# Patient Record
Sex: FEMALE | Race: WHITE | NOT HISPANIC OR LATINO
[De-identification: names, ages, dates, MRNs, and addresses within clinical notes are randomized per-mention and may not be internally consistent; named-entity substitution may affect disease eponyms.]

---

## 2021-05-25 ENCOUNTER — APPOINTMENT (OUTPATIENT)
Age: 21
End: 2021-05-25

## 2021-06-08 ENCOUNTER — APPOINTMENT (OUTPATIENT)
Age: 21
End: 2021-06-08

## 2021-06-19 ENCOUNTER — APPOINTMENT (OUTPATIENT)
Age: 21
End: 2021-06-19
Payer: MEDICAID

## 2021-06-19 PROCEDURE — 0001A: CPT

## 2021-07-10 ENCOUNTER — APPOINTMENT (OUTPATIENT)
Age: 21
End: 2021-07-10
Payer: MEDICAID

## 2021-07-10 PROCEDURE — 0002A: CPT

## 2022-02-03 PROBLEM — Z00.00 ENCOUNTER FOR PREVENTIVE HEALTH EXAMINATION: Status: ACTIVE | Noted: 2022-02-03

## 2022-03-10 ENCOUNTER — TRANSCRIPTION ENCOUNTER (OUTPATIENT)
Age: 22
End: 2022-03-10

## 2022-04-11 ENCOUNTER — TRANSCRIPTION ENCOUNTER (OUTPATIENT)
Age: 22
End: 2022-04-11

## 2022-08-11 ENCOUNTER — APPOINTMENT (OUTPATIENT)
Dept: OBGYN | Facility: CLINIC | Age: 22
End: 2022-08-11

## 2022-08-11 VITALS
WEIGHT: 99 LBS | SYSTOLIC BLOOD PRESSURE: 100 MMHG | BODY MASS INDEX: 18.69 KG/M2 | HEIGHT: 61 IN | DIASTOLIC BLOOD PRESSURE: 60 MMHG

## 2022-08-11 DIAGNOSIS — Z86.39 PERSONAL HISTORY OF OTHER ENDOCRINE, NUTRITIONAL AND METABOLIC DISEASE: ICD-10-CM

## 2022-08-11 DIAGNOSIS — Z80.3 FAMILY HISTORY OF MALIGNANT NEOPLASM OF BREAST: ICD-10-CM

## 2022-08-11 DIAGNOSIS — Z87.42 PERSONAL HISTORY OF OTHER DISEASES OF THE FEMALE GENITAL TRACT: ICD-10-CM

## 2022-08-11 DIAGNOSIS — Z82.3 FAMILY HISTORY OF STROKE: ICD-10-CM

## 2022-08-11 DIAGNOSIS — Z01.419 ENCOUNTER FOR GYNECOLOGICAL EXAMINATION (GENERAL) (ROUTINE) W/OUT ABNORMAL FINDINGS: ICD-10-CM

## 2022-08-11 DIAGNOSIS — Z78.9 OTHER SPECIFIED HEALTH STATUS: ICD-10-CM

## 2022-08-11 DIAGNOSIS — Z80.42 FAMILY HISTORY OF MALIGNANT NEOPLASM OF PROSTATE: ICD-10-CM

## 2022-08-11 LAB
HCG UR QL: NEGATIVE
QUALITY CONTROL: YES

## 2022-08-11 PROCEDURE — 99385 PREV VISIT NEW AGE 18-39: CPT

## 2022-08-11 PROCEDURE — 81025 URINE PREGNANCY TEST: CPT

## 2022-08-11 RX ORDER — LEVOTHYROXINE SODIUM 0.05 MG/1
50 TABLET ORAL
Qty: 30 | Refills: 0 | Status: ACTIVE | COMMUNITY
Start: 2022-08-06

## 2022-08-11 NOTE — HISTORY OF PRESENT ILLNESS
[FreeTextEntry1] : ANTON GOINS is a 22 year F G0  here for annual. not sexually active. in a relationshiop with a male partner - feels safe, they are waiting. On junel fe 1/20 for menorrhagia. was getting her menses for 2 weeks so was started on it. now less than a week. Also was recently diagnoses with hypothyroid - likely cause of menorrhagia\par Denies vaginal bleeding, discharge or pain.\par \par BC:junel 1/20 fe\par Gynhx: LMP 13 yrs oldm Reg menses, No h/o STIs/fibroids/cysts/abn paps\par Obhx: nullip\par \par Last mammo: n/a\par Last Colonoscopy: n/a\par Last Pap smear: declines pap smear as she is not sexually active\par Last dexa:n/a\par

## 2022-08-11 NOTE — DISCUSSION/SUMMARY
[FreeTextEntry1] : ANTON GOINS is a 22 year G0 here for WWE, normal exam, refuses pelvic exam\par - spoke about gardasil - she will find out if she got it\par - ocp refill sent\par - pap - declines as she is not sexually active\par - GC/CH sent via urine\par - SBE reviewed\par - Follow up in 1 year for annual or PRN\par

## 2022-08-11 NOTE — COUNSELING
[Vitamins/Supplements] : vitamins/supplements [Breast Self Exam] : breast self exam [HPV Vaccine] : HPV Vaccine

## 2022-08-11 NOTE — PHYSICAL EXAM
[Appropriately responsive] : appropriately responsive [Alert] : alert [No Acute Distress] : no acute distress [No Lymphadenopathy] : no lymphadenopathy [Regular Rate Rhythm] : regular rate rhythm [Soft] : soft [Non-tender] : non-tender [Non-distended] : non-distended [No HSM] : No HSM [No Lesions] : no lesions [No Mass] : no mass [Oriented x3] : oriented x3 [Examination Of The Breasts] : a normal appearance [Normal] : normal [No Masses] : no breast masses were palpable [FreeTextEntry1] : declines

## 2023-05-27 ENCOUNTER — NON-APPOINTMENT (OUTPATIENT)
Age: 23
End: 2023-05-27

## 2023-06-05 ENCOUNTER — NON-APPOINTMENT (OUTPATIENT)
Age: 23
End: 2023-06-05

## 2023-06-06 ENCOUNTER — RX RENEWAL (OUTPATIENT)
Age: 23
End: 2023-06-06

## 2023-08-14 ENCOUNTER — APPOINTMENT (OUTPATIENT)
Dept: OBGYN | Facility: CLINIC | Age: 23
End: 2023-08-14

## 2023-08-31 ENCOUNTER — APPOINTMENT (OUTPATIENT)
Dept: OBGYN | Facility: CLINIC | Age: 23
End: 2023-08-31
Payer: MEDICAID

## 2023-08-31 ENCOUNTER — RX RENEWAL (OUTPATIENT)
Age: 23
End: 2023-08-31

## 2023-08-31 VITALS
BODY MASS INDEX: 18.69 KG/M2 | DIASTOLIC BLOOD PRESSURE: 90 MMHG | SYSTOLIC BLOOD PRESSURE: 132 MMHG | HEIGHT: 61 IN | WEIGHT: 99 LBS

## 2023-08-31 PROCEDURE — 99395 PREV VISIT EST AGE 18-39: CPT

## 2023-08-31 NOTE — DISCUSSION/SUMMARY
[FreeTextEntry1] : 24 y/o on ocp for cycle control  Refill x 1 yr sent BSE discussed  HPV vaccine discussed

## 2023-08-31 NOTE — HISTORY OF PRESENT ILLNESS
[FreeTextEntry1] : ANTON GOINS is a 23 year F G0  here for annual. not sexually active. Was in a relationship but it ended. She  still desires to continue on the junel fe 1/20 for menorrhagia. was getting her menses for 2 weeks so was started on it. now less than a week.   denies any itching or burning or abnormal discharge  BC:junel 1/20 fe Gynhx: LMP 13 yrs oldm Reg menses, No h/o STIs/fibroids/cysts/abn paps Obhx: nullip  Last mammo: n/a Last Colonoscopy: n/a Last Pap smear: declines pap smear as she is not sexually active Last dexa:n/a

## 2023-08-31 NOTE — PHYSICAL EXAM
[Chaperone Declined] : Patient declined chaperone [Appropriately responsive] : appropriately responsive [Alert] : alert [No Acute Distress] : no acute distress [No Murmurs] : no murmurs [Soft] : soft [Non-tender] : non-tender [Non-distended] : non-distended [No HSM] : No HSM [No Lesions] : no lesions [No Mass] : no mass [Oriented x3] : oriented x3 [Examination Of The Breasts] : a normal appearance [Normal] : normal [No Masses] : no breast masses were palpable

## 2023-11-11 ENCOUNTER — NON-APPOINTMENT (OUTPATIENT)
Age: 23
End: 2023-11-11

## 2023-11-20 ENCOUNTER — RX RENEWAL (OUTPATIENT)
Age: 23
End: 2023-11-20

## 2024-04-22 RX ORDER — NORETHINDRONE ACETATE AND ETHINYL ESTRADIOL AND FERROUS FUMARATE 1MG-20(21)
1-20 KIT ORAL
Qty: 84 | Refills: 0 | Status: ACTIVE | COMMUNITY
Start: 2021-10-26 | End: 1900-01-01